# Patient Record
Sex: FEMALE | Race: WHITE | NOT HISPANIC OR LATINO | Employment: FULL TIME | URBAN - METROPOLITAN AREA
[De-identification: names, ages, dates, MRNs, and addresses within clinical notes are randomized per-mention and may not be internally consistent; named-entity substitution may affect disease eponyms.]

---
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
## 2021-05-06 ENCOUNTER — PATIENT MESSAGE (OUTPATIENT)
Dept: RESEARCH | Facility: HOSPITAL | Age: 21
End: 2021-05-06

## 2023-04-12 ENCOUNTER — TELEPHONE (OUTPATIENT)
Dept: URGENT CARE | Facility: CLINIC | Age: 23
End: 2023-04-12

## 2023-04-12 ENCOUNTER — OFFICE VISIT (OUTPATIENT)
Dept: URGENT CARE | Facility: CLINIC | Age: 23
End: 2023-04-12
Payer: MEDICAID

## 2023-04-12 VITALS
HEIGHT: 64 IN | HEART RATE: 73 BPM | SYSTOLIC BLOOD PRESSURE: 111 MMHG | BODY MASS INDEX: 44.39 KG/M2 | OXYGEN SATURATION: 97 % | RESPIRATION RATE: 20 BRPM | WEIGHT: 260 LBS | DIASTOLIC BLOOD PRESSURE: 88 MMHG | TEMPERATURE: 98 F

## 2023-04-12 DIAGNOSIS — M79.674 GREAT TOE PAIN, RIGHT: Primary | ICD-10-CM

## 2023-04-12 DIAGNOSIS — S99.921A TOE INJURY, RIGHT, INITIAL ENCOUNTER: ICD-10-CM

## 2023-04-12 PROCEDURE — 99214 PR OFFICE/OUTPT VISIT, EST, LEVL IV, 30-39 MIN: ICD-10-PCS | Mod: S$GLB,,, | Performed by: PHYSICIAN ASSISTANT

## 2023-04-12 PROCEDURE — 99214 OFFICE O/P EST MOD 30 MIN: CPT | Mod: S$GLB,,, | Performed by: PHYSICIAN ASSISTANT

## 2023-04-12 RX ORDER — ELAGOLIX 150 MG/1
1 TABLET, FILM COATED ORAL
COMMUNITY
Start: 2023-03-24

## 2023-04-12 NOTE — TELEPHONE ENCOUNTER
I called the patient to inform her that the provider states that her xray results came back negative, no signs of fractures or breaks. I confirmed with her that wrapping it may help with the swelling. Patient had no further questions or concerns.

## 2023-04-12 NOTE — PROGRESS NOTES
"Subjective:      Patient ID: Titi Huffman is a 22 y.o. female.    Vitals:  height is 5' 4" (1.626 m) and weight is 117.9 kg (260 lb). Her oral temperature is 98 °F (36.7 °C). Her blood pressure is 111/88 and her pulse is 73. Her respiration is 20 and oxygen saturation is 97%.     Chief Complaint: Foot Swelling    Patient states on Monday she had stubbed her right big toe on the cement parking block. Then next day while she was at work her whole foot was swollen.     Foot Injury   The incident occurred 3 to 5 days ago. The incident occurred in the street. The injury mechanism was a direct blow. The pain is present in the right foot. The quality of the pain is described as aching and burning. The pain is at a severity of 8/10. The pain is moderate. The pain has been Constant since onset. Associated symptoms include an inability to bear weight. She reports no foreign bodies present. The symptoms are aggravated by movement and weight bearing. She has tried acetaminophen and elevation for the symptoms. The treatment provided no relief.     Musculoskeletal:  Positive for pain, trauma, joint pain and joint swelling.    Objective:     Physical Exam   Constitutional: She is oriented to person, place, and time. She appears well-developed. She is cooperative.  Non-toxic appearance. She does not appear ill. No distress.   HENT:   Head: Normocephalic and atraumatic.   Ears:   Right Ear: Hearing normal.   Left Ear: Hearing normal.   Eyes: Conjunctivae and lids are normal. No scleral icterus.   Neck: Phonation normal. Neck supple.   Cardiovascular: Normal rate and normal pulses.   Pulmonary/Chest: Effort normal. No respiratory distress.   Abdominal: Normal appearance.   Musculoskeletal:        Feet:    Neurological: She is alert and oriented to person, place, and time. Coordination normal.   Skin: Skin is intact, not diaphoretic and not pale.   Psychiatric: Her speech is normal and behavior is normal. Judgment and thought " content normal.   Nursing note and vitals reviewed.    Assessment:     1. Great toe pain, right    2. Toe injury, right, initial encounter        Plan:       Great toe pain, right  -     XR FOOT COMPLETE 3 VIEW RIGHT; Future; Expected date: 04/12/2023    Toe injury, right, initial encounter  -     XR FOOT COMPLETE 3 VIEW RIGHT; Future; Expected date: 04/12/2023      Patient aware that we do not have imaging available today in clinic. Order placed and will obtain at [facility]. Will call with results and further recommendations. Recommend rest, ice, elevation and NSAIDs as directed on packaging. Return PRN.    XR FOOT COMPLETE 3 VIEW RIGHT    Result Date: 4/12/2023  EXAMINATION: XR FOOT COMPLETE 3 VIEW RIGHT CLINICAL HISTORY: Pain in right toe(s) FINDINGS: No fracture or dislocation.   Unremarkable soft tissues.     No acute abnormality. Electronically signed by: Dangelo Akhtar MD Date:    04/12/2023 Time:    13:16       Medical Decision Making:   Independently Interpreted Test(s):   I have ordered and independently interpreted X-rays - see summary below.       <> Summary of X-Ray Reading(s): Right foot- no acute fracture or dislocation  Clinical Tests:   Radiological Study: Ordered and Reviewed

## 2023-04-12 NOTE — LETTER
April 12, 2023      [place] [facility]  [address], [address]  [place] [address]  Phone: [phone]  Fax: [phone]       Patient: Titi Huffman   YOB: 2000  Date of Visit: 04/12/2023    To Whom It May Concern:    Gail Huffman  was at [facility] on 04/12/2023. The patient may return to work/school on 04/13/2023 with no restrictions (standing as tolerated). If you have any questions or concerns, or if I can be of further assistance, please do not hesitate to contact me.    Sincerely,    Tiff Cox PA-C

## 2023-05-01 ENCOUNTER — OFFICE VISIT (OUTPATIENT)
Dept: URGENT CARE | Facility: CLINIC | Age: 23
End: 2023-05-01
Payer: MEDICAID

## 2023-05-01 VITALS
BODY MASS INDEX: 42 KG/M2 | DIASTOLIC BLOOD PRESSURE: 88 MMHG | OXYGEN SATURATION: 98 % | RESPIRATION RATE: 20 BRPM | HEART RATE: 81 BPM | TEMPERATURE: 98 F | SYSTOLIC BLOOD PRESSURE: 128 MMHG | WEIGHT: 246 LBS | HEIGHT: 64 IN

## 2023-05-01 DIAGNOSIS — J02.9 SORE THROAT: ICD-10-CM

## 2023-05-01 DIAGNOSIS — J06.9 VIRAL UPPER RESPIRATORY ILLNESS: Primary | ICD-10-CM

## 2023-05-01 LAB
CTP QC/QA: YES
MOLECULAR STREP A: NEGATIVE

## 2023-05-01 PROCEDURE — 99213 OFFICE O/P EST LOW 20 MIN: CPT | Mod: S$GLB,,,

## 2023-05-01 PROCEDURE — 87651 POCT STREP A MOLECULAR: ICD-10-PCS | Mod: QW,S$GLB,,

## 2023-05-01 PROCEDURE — 87651 STREP A DNA AMP PROBE: CPT | Mod: QW,S$GLB,,

## 2023-05-01 PROCEDURE — 99213 PR OFFICE/OUTPT VISIT, EST, LEVL III, 20-29 MIN: ICD-10-PCS | Mod: S$GLB,,,

## 2023-05-01 RX ORDER — GUAIFENESIN 600 MG/1
1200 TABLET, EXTENDED RELEASE ORAL 2 TIMES DAILY
Qty: 40 TABLET | Refills: 0 | Status: SHIPPED | OUTPATIENT
Start: 2023-05-01 | End: 2023-05-11

## 2023-05-01 RX ORDER — CETIRIZINE HYDROCHLORIDE 10 MG/1
10 TABLET ORAL DAILY
Qty: 30 TABLET | Refills: 0 | Status: SHIPPED | OUTPATIENT
Start: 2023-05-01 | End: 2023-07-17

## 2023-05-01 RX ORDER — FLUTICASONE PROPIONATE 50 MCG
1 SPRAY, SUSPENSION (ML) NASAL DAILY
Qty: 9.9 ML | Refills: 0 | Status: SHIPPED | OUTPATIENT
Start: 2023-05-01 | End: 2023-05-08

## 2023-05-01 NOTE — PROGRESS NOTES
"Subjective:      Patient ID: Titi Huffman is a 22 y.o. female.    Vitals:  height is 5' 4" (1.626 m) and weight is 111.6 kg (246 lb). Her tympanic temperature is 98.1 °F (36.7 °C). Her blood pressure is 128/88 and her pulse is 81. Her respiration is 20 and oxygen saturation is 98%.     Chief Complaint: URI    Pt requesting strep test, patient stated she was recently exposed at work.    URI   This is a new problem. The current episode started yesterday. The problem has been gradually worsening. There has been no fever. Associated symptoms include congestion, coughing, ear pain (stabbing pain), headaches, rhinorrhea, sneezing, a sore throat and wheezing. Pertinent negatives include no sinus pain. Treatments tried: tylenol sinus severe. The treatment provided no relief.     Constitution: Negative for fever.   HENT:  Positive for ear pain (stabbing pain), congestion, postnasal drip and sore throat. Negative for sinus pain.    Respiratory:  Positive for cough and wheezing.    Allergic/Immunologic: Positive for sneezing.   Neurological:  Positive for headaches.    Objective:     Physical Exam   Constitutional: She is oriented to person, place, and time. She appears well-developed. She is cooperative.  Non-toxic appearance. She does not appear ill. No distress.   HENT:   Head: Normocephalic and atraumatic.   Ears:   Right Ear: Hearing, external ear and ear canal normal.   Left Ear: Hearing, external ear and ear canal normal.      Comments: Cloudy TM bilaterally.  Nose: Congestion present. No mucosal edema, rhinorrhea or nasal deformity. No epistaxis. Right sinus exhibits no maxillary sinus tenderness and no frontal sinus tenderness. Left sinus exhibits no maxillary sinus tenderness and no frontal sinus tenderness.   Mouth/Throat: Uvula is midline and mucous membranes are normal. Mucous membranes are moist. No trismus in the jaw. Normal dentition. No uvula swelling. Posterior oropharyngeal erythema present. No " oropharyngeal exudate or posterior oropharyngeal edema.   Eyes: Conjunctivae and lids are normal. No scleral icterus.   Neck: Trachea normal and phonation normal. Neck supple. No edema present. No erythema present. No neck rigidity present.   Cardiovascular: Normal rate, regular rhythm, normal heart sounds and normal pulses.   Pulmonary/Chest: Effort normal. No respiratory distress. She has no decreased breath sounds. She has no wheezes.         Comments: Patient is able to talk in complete sentences. No signs of increase work of breathing. No wheezing noted on auscultation.    Abdominal: Normal appearance.   Musculoskeletal: Normal range of motion.         General: No deformity. Normal range of motion.   Neurological: She is alert and oriented to person, place, and time. She exhibits normal muscle tone. Coordination normal.   Skin: Skin is warm, dry, intact, not diaphoretic and not pale.   Psychiatric: Her speech is normal and behavior is normal. Judgment and thought content normal.   Nursing note and vitals reviewed.  Results for orders placed or performed in visit on 05/01/23   POCT Strep A, Molecular   Result Value Ref Range    Molecular Strep A, POC Negative Negative     Acceptable Yes        Assessment:     1. Viral upper respiratory illness    2. Sore throat        Plan:       Viral upper respiratory illness  -     fluticasone propionate (FLONASE) 50 mcg/actuation nasal spray; 1 spray (50 mcg total) by Each Nostril route once daily. for 7 days  Dispense: 9.9 mL; Refill: 0  -     guaiFENesin (MUCINEX) 600 mg 12 hr tablet; Take 2 tablets (1,200 mg total) by mouth 2 (two) times daily. for 10 days  Dispense: 40 tablet; Refill: 0  -     cetirizine (ZYRTEC) 10 MG tablet; Take 1 tablet (10 mg total) by mouth once daily.  Dispense: 30 tablet; Refill: 0    Sore throat  -     POCT Strep A, Molecular      VS stable.    Patient declined Covid swab. Strep negative. Gargle with warm salt water.   Please  drink plenty of fluids. Please get plenty of rest.  Please return here or go to the Emergency Department for any concerns or worsening of condition.  If you do not have Hypertension or any history of palpitations, it is ok to take over the counter Sudafed or Mucinex D or Allegra-D or Claritin-D or Zyrtec-D.  If you do take one of the above, it is ok to combine that with plain over the counter Mucinex or Allegra or Claritin or Zyrtec.  If for example you are taking Zyrtec -D, you can combine that with Mucinex, but not Mucinex-D.  If you are taking Mucinex-D, you can combine that with plain Allegra or Claritin or Zyrtec.   If you do have Hypertension or palpitations, it is safe to take Coricidin HBP for relief of sinus symptoms.  If not allergic, please take over the counter Tylenol (Acetaminophen) and/or Motrin (Ibuprofen) as directed for control of pain and/or fever.  Please follow up with your primary care doctor or specialist as needed.    If you  smoke, please stop smoking.     Discussed with patient the importance of f/u with their primary care provider. Urged to go to the ER for any worsening signs or symptoms.

## 2023-05-01 NOTE — LETTER
May 1, 2023      [place] [facility]  [address], [address]  [place] [address]  Phone: [phone]  Fax: [phone]       Patient: Titi Huffman   YOB: 2000  Date of Visit: 05/01/2023    To Whom It May Concern:    Gail Huffman  was at [facility] on 05/01/2023. The patient may return to work/school on 5/2/23 with no restrictions. If you have any questions or concerns, or if I can be of further assistance, please do not hesitate to contact me.    Sincerely,    Mandy Corea PA-C

## 2023-07-17 ENCOUNTER — OFFICE VISIT (OUTPATIENT)
Dept: URGENT CARE | Facility: CLINIC | Age: 23
End: 2023-07-17
Payer: MEDICAID

## 2023-07-17 VITALS
WEIGHT: 253 LBS | BODY MASS INDEX: 42.15 KG/M2 | DIASTOLIC BLOOD PRESSURE: 82 MMHG | TEMPERATURE: 99 F | HEART RATE: 73 BPM | HEIGHT: 65 IN | SYSTOLIC BLOOD PRESSURE: 114 MMHG | RESPIRATION RATE: 18 BRPM | OXYGEN SATURATION: 96 %

## 2023-07-17 DIAGNOSIS — L50.9 URTICARIAL RASH: Primary | ICD-10-CM

## 2023-07-17 PROCEDURE — 99213 OFFICE O/P EST LOW 20 MIN: CPT | Mod: S$GLB,,, | Performed by: PHYSICIAN ASSISTANT

## 2023-07-17 PROCEDURE — 99213 PR OFFICE/OUTPT VISIT, EST, LEVL III, 20-29 MIN: ICD-10-PCS | Mod: S$GLB,,, | Performed by: PHYSICIAN ASSISTANT

## 2023-07-17 RX ORDER — CETIRIZINE HYDROCHLORIDE 10 MG/1
10 TABLET ORAL DAILY
Qty: 30 TABLET | Refills: 0 | Status: SHIPPED | OUTPATIENT
Start: 2023-07-17 | End: 2024-07-16

## 2023-07-17 RX ORDER — TRIAMCINOLONE ACETONIDE 1 MG/G
CREAM TOPICAL 2 TIMES DAILY
Qty: 30 G | Refills: 0 | Status: SHIPPED | OUTPATIENT
Start: 2023-07-17

## 2023-07-17 RX ORDER — PREDNISONE 20 MG/1
20 TABLET ORAL DAILY
Qty: 5 TABLET | Refills: 0 | Status: SHIPPED | OUTPATIENT
Start: 2023-07-17 | End: 2023-07-22

## 2023-07-17 NOTE — PROGRESS NOTES
"Subjective:      Patient ID: Titi Huffman is a 23 y.o. female.    Vitals:  height is 5' 5" (1.651 m) and weight is 114.8 kg (253 lb). Her oral temperature is 98.5 °F (36.9 °C). Her blood pressure is 114/82 and her pulse is 73. Her respiration is 18 and oxygen saturation is 96%.     Chief Complaint: Rash    Patient c/o rash that she noticed around 7 am. States the rash is everywhere but worse on the arms. She has not used any otc treatments. Patient states she was at her uncles house who has spots of poison ivy but she does not recall contact with the plant.    Denies any changes in soaps, detergents or lotions. No knew medications. Denies SOB or trouble swallowing    Rash  This is a new problem. The current episode started today. The problem is unchanged. The affected locations include the head, left arm, left hand, left wrist, left foot, left lower leg, right arm, right hand, right wrist, right lower leg and right foot. The rash is characterized by dryness, itchiness, redness and swelling. It is unknown if there was an exposure to a precipitant. Past treatments include nothing. The treatment provided no relief.     Skin:  Positive for rash.    Objective:     Physical Exam   Constitutional: She is oriented to person, place, and time. She appears well-developed. She is cooperative.  Non-toxic appearance. She does not appear ill. No distress.   HENT:   Head: Normocephalic and atraumatic.   Ears:   Right Ear: Hearing normal.   Left Ear: Hearing normal.   Mouth/Throat: Mucous membranes are moist. No oropharyngeal exudate or posterior oropharyngeal erythema. Oropharynx is clear.   Eyes: Conjunctivae and lids are normal. No scleral icterus.   Neck: Phonation normal. Neck supple.   Cardiovascular: Normal rate.   Pulmonary/Chest: Effort normal. No respiratory distress.   Abdominal: Normal appearance.   Neurological: She is alert and oriented to person, place, and time. Coordination normal.   Skin: Skin is intact, " not diaphoretic, not pale and rash.         Comments: Urticarial rash to left forearm, abdomen and right hand   Psychiatric: Her speech is normal and behavior is normal. Judgment and thought content normal.   Nursing note and vitals reviewed.    Assessment:     1. Urticarial rash        Plan:       Urticarial rash  -     cetirizine (ZYRTEC) 10 MG tablet; Take 1 tablet (10 mg total) by mouth once daily.  Dispense: 30 tablet; Refill: 0  -     triamcinolone acetonide 0.1% (KENALOG) 0.1 % cream; Apply topically 2 (two) times daily. Do not use on face or in groin  Dispense: 30 g; Refill: 0  -     predniSONE (DELTASONE) 20 MG tablet; Take 1 tablet (20 mg total) by mouth once daily. for 5 days  Dispense: 5 tablet; Refill: 0      Discussed with patient the importance of f/u with their primary care provider. Urged to go to the ER for any worsening signs or symptoms.

## 2023-07-17 NOTE — LETTER
July 17, 2023      [place] [facility]  [address], [address]  [place] [address]  Phone: [phone]  Fax: [phone]       Patient: Titi Huffman   YOB: 2000  Date of Visit: 07/17/2023    To Whom It May Concern:    Gail Huffman  was at [facility] on 07/17/2023. The patient may return to work/school on 07/18/2023 with no restrictions. If you have any questions or concerns, or if I can be of further assistance, please do not hesitate to contact me.    Sincerely,    Tiff Cox PA-C

## 2023-10-08 ENCOUNTER — OFFICE VISIT (OUTPATIENT)
Dept: URGENT CARE | Facility: CLINIC | Age: 23
End: 2023-10-08
Payer: MEDICAID

## 2023-10-08 VITALS
OXYGEN SATURATION: 96 % | DIASTOLIC BLOOD PRESSURE: 95 MMHG | HEIGHT: 65 IN | BODY MASS INDEX: 42.82 KG/M2 | SYSTOLIC BLOOD PRESSURE: 130 MMHG | TEMPERATURE: 100 F | WEIGHT: 257 LBS | RESPIRATION RATE: 21 BRPM | HEART RATE: 101 BPM

## 2023-10-08 DIAGNOSIS — K04.7 DENTAL ABSCESS: Primary | ICD-10-CM

## 2023-10-08 DIAGNOSIS — K02.9 DENTAL DECAY: ICD-10-CM

## 2023-10-08 DIAGNOSIS — K03.81 CRACKED TOOTH: ICD-10-CM

## 2023-10-08 PROCEDURE — 99214 OFFICE O/P EST MOD 30 MIN: CPT | Mod: S$GLB,,, | Performed by: NURSE PRACTITIONER

## 2023-10-08 PROCEDURE — 99214 PR OFFICE/OUTPT VISIT, EST, LEVL IV, 30-39 MIN: ICD-10-PCS | Mod: S$GLB,,, | Performed by: NURSE PRACTITIONER

## 2023-10-08 RX ORDER — NAPROXEN 500 MG/1
500 TABLET ORAL 2 TIMES DAILY WITH MEALS
Qty: 14 TABLET | Refills: 0 | Status: SHIPPED | OUTPATIENT
Start: 2023-10-08 | End: 2023-10-15

## 2023-10-08 RX ORDER — TRAZODONE HYDROCHLORIDE 100 MG/1
100 TABLET ORAL NIGHTLY
COMMUNITY
Start: 2023-09-26

## 2023-10-08 RX ORDER — CLINDAMYCIN HYDROCHLORIDE 150 MG/1
450 CAPSULE ORAL EVERY 8 HOURS
Qty: 63 CAPSULE | Refills: 0 | Status: SHIPPED | OUTPATIENT
Start: 2023-10-08 | End: 2023-10-15

## 2023-10-08 RX ORDER — LURASIDONE HYDROCHLORIDE 20 MG/1
20 TABLET, FILM COATED ORAL
COMMUNITY
Start: 2023-09-26

## 2023-10-08 NOTE — PATIENT INSTRUCTIONS
1. Your dental pain is caused by dental decay (cavities) and/or dental infection (abscess).  Antibiotics will help to treat the infection (abscess) if you have one but will not necessarily get rid of the pain.  You will be prescribed NSAIDs to help with your pain, but the only thing that will truly get rid of your pain is to see a Dentist and have your tooth/teeth fixed.  You should schedule a follow-up appointment with a Dentist ASAP (within 1-5 days).   2. -[facility] on [address] [place] [address], [place], [place], [phone], accepts Adult Medicaid insurance.   -You can also call [facility] at [phone] or [phone]. Their website ([email or web address]) has a lot of information about what to expect and payment options.  They are currently accepting new patients. [facility]'s prices are typically 1/3 to 1/4 of private practice prices.  3.  Take all medications as directed. If you have been prescribed antibiotics, make sure to complete them.   4.  Rest and keep yourself/patient well hydrated. For adults, it is recommended to drink at least 8-10 glasses of water daily.   5.  For patients above 6 months of age who are not allergic to and are not on anticoagulants, you can alternate Tylenol and Motrin every 4-6 hours for fever above 100.4F and/or pain.  For patients less than 6 months of age, allergic to or intolerant to NSAIDS, have gastritis, gastric ulcers, or history of GI bleeds, are pregnant, or are on anticoagulant therapy, you can take Tylenol every 4 hours as needed for fever above 100.4F and/or pain.   6.  If your condition fails to improve in a timely manner, you should receive another evaluation by your Primary Care Provider/Pediatrician to discuss your concerns or return to urgent care for a recheck.  If your condition worsens at any time, you should report immediately to your nearest Emergency Department for further evaluation. **You must  "understand that you have received Urgent Care treatment only and that you may be released before all of your medical problems are known or treated. You, the patient, are responsible to arrange for follow-up care as instructed.     HOME CARE:  1. Avoid extremely hot and cold foods and liquids since your tooth may be sensitive to temperature changes.  2. If your tooth is chipped or cracked, or if there is a large open cavity, apply OIL OF CLOVES (available over-the-counter in drug stores) directly to the tooth to reduce pain. Some pharmacies carry an over-the-counter "toothache kit." This contains a paste, which can be applied over the exposed tooth to decrease sensitivity.  3. A cold pack on your jaw over the sore area may help reduce pain and swelling.      TOOTHACHE IS A SIGN OF DISEASE IN YOUR TOOTH AND SHOULD BE EXAMINED AND TREATED BY A DENTIST ASAP.    GET PROMPT MEDICAL ATTENTION if any of the following occur:  Your face becomes swollen or red  Pain worsens or spreads to the neck  Fever over 100.4º F (38.0º C)  Unusual drowsiness; headache or stiff neck; weakness or fainting  Pus drains from the tooth  Difficulty swallowing or breathing   "

## 2023-10-08 NOTE — PROGRESS NOTES
"Subjective:      Patient ID: Titi Huffman is a 23 y.o. female.    Vitals:  height is 5' 5" (1.651 m) and weight is 116.6 kg (257 lb). Her tympanic temperature is 99.5 °F (37.5 °C). Her blood pressure is 130/95 (abnormal) and her pulse is 101. Her respiration is 21 (abnormal) and oxygen saturation is 96%.     Chief Complaint: Dental Pain    Dental Pain   This is a new problem. The current episode started yesterday. The problem occurs constantly. The problem has been gradually worsening. The pain is at a severity of 8/10. The pain is moderate. Associated symptoms include difficulty swallowing, facial pain and sinus pressure. Pertinent negatives include no fever, oral bleeding or thermal sensitivity. She has tried ice and NSAIDs (cold compress and tylenol) for the symptoms. The treatment provided no relief.       Constitution: Negative for fever.   HENT:  Positive for sinus pressure.       Objective:     Physical Exam   Constitutional: She is oriented to person, place, and time. She appears well-developed. She is cooperative.  Non-toxic appearance. No distress.   HENT:   Head: Normocephalic and atraumatic.       Ears:   Right Ear: Tympanic membrane, external ear and ear canal normal.   Left Ear: Tympanic membrane, external ear and ear canal normal.   Nose: Mucosal edema present. No rhinorrhea or nasal deformity. No epistaxis. Right sinus exhibits no maxillary sinus tenderness and no frontal sinus tenderness. Left sinus exhibits no maxillary sinus tenderness and no frontal sinus tenderness.   Mouth/Throat: Uvula is midline, oropharynx is clear and moist and mucous membranes are normal. No trismus in the jaw. Abnormal dentition. Dental abscesses and dental caries present. No uvula swelling. No oropharyngeal exudate, posterior oropharyngeal edema or posterior oropharyngeal erythema.       Eyes: Conjunctivae and lids are normal. No scleral icterus.   Neck: Trachea normal and phonation normal. Neck supple. No edema " present. No erythema present. No neck rigidity present.   Cardiovascular: Normal rate, regular rhythm, normal heart sounds and normal pulses.   Pulmonary/Chest: Effort normal and breath sounds normal. No respiratory distress. She has no decreased breath sounds. She has no rhonchi.   Abdominal: Normal appearance.   Musculoskeletal: Normal range of motion.         General: No deformity. Normal range of motion.   Neurological: She is alert and oriented to person, place, and time. She exhibits normal muscle tone. Coordination normal.   Skin: Skin is warm, dry, intact, not diaphoretic and not pale.   Psychiatric: Her speech is normal and behavior is normal. Judgment and thought content normal.   Nursing note and vitals reviewed.      Assessment:     1. Dental abscess    2. Cracked tooth    3. Dental decay        Plan:       Dental abscess  -     clindamycin (CLEOCIN) 150 MG capsule; Take 3 capsules (450 mg total) by mouth every 8 (eight) hours. for 7 days  Dispense: 63 capsule; Refill: 0  -     naproxen (NAPROSYN) 500 MG tablet; Take 1 tablet (500 mg total) by mouth 2 (two) times daily with meals. for 7 days  Dispense: 14 tablet; Refill: 0  -     Ambulatory referral/consult to Dentistry    Cracked tooth  -     Ambulatory referral/consult to Dentistry    Dental decay  -     Ambulatory referral/consult to Dentistry

## 2023-10-08 NOTE — LETTER
October 8, 2023      [place]  [address], [address]  [place] [address]  Phone: [phone]  Fax: [phone]       Patient: Titi Huffman   YOB: 2000  Date of Visit: 10/08/2023    To Whom It May Concern:    Gail Huffman  was at [facility] on 10/08/2023. The patient may return to work/school on 10/10/2023 with no restrictions. If you have any questions or concerns, or if I can be of further assistance, please do not hesitate to contact me.    Sincerely,    Shy Hernandez MA

## 2024-08-10 ENCOUNTER — OFFICE VISIT (OUTPATIENT)
Dept: URGENT CARE | Facility: CLINIC | Age: 24
End: 2024-08-10

## 2024-08-10 VITALS
HEART RATE: 98 BPM | RESPIRATION RATE: 17 BRPM | OXYGEN SATURATION: 99 % | BODY MASS INDEX: 41.15 KG/M2 | WEIGHT: 247 LBS | DIASTOLIC BLOOD PRESSURE: 84 MMHG | SYSTOLIC BLOOD PRESSURE: 122 MMHG | TEMPERATURE: 99 F | HEIGHT: 65 IN

## 2024-08-10 DIAGNOSIS — M67.432 GANGLION OF LEFT WRIST: Primary | ICD-10-CM

## 2024-08-10 PROCEDURE — 99214 OFFICE O/P EST MOD 30 MIN: CPT | Mod: TIER,S$GLB,,

## 2024-08-10 NOTE — PROGRESS NOTES
"Subjective:      Patient ID: Titi Huffman is a 24 y.o. female.    Vitals:  height is 5' 5" (1.651 m) and weight is 112 kg (247 lb). Her oral temperature is 98.9 °F (37.2 °C). Her blood pressure is 122/84 and her pulse is 98. Her respiration is 17 and oxygen saturation is 99%.     Chief Complaint: Hand Pain    Pt is coming in for a bump on her right wrist that she noticed last night. Pt has also had numbness in the top of her hand on and off for a year and before going numb, feels like her hand is on fire.    Hand Pain   Her dominant hand is their left hand. The incident occurred 12 to 24 hours ago. The incident occurred at home. There was no injury mechanism. The pain is present in the left wrist and left hand. Quality: numbing. The pain does not radiate. The pain is at a severity of 5/10. The pain is mild. The pain has been Fluctuating since the incident. Nothing aggravates the symptoms. Treatments tried: excedrine. The treatment provided no relief.       Constitution: Negative for fever.   Skin:  Negative for erythema.        Lump on wrist      Objective:     Physical Exam   Constitutional: She is oriented to person, place, and time. She appears well-developed.  Non-toxic appearance. She does not appear ill. No distress.   HENT:   Head: Normocephalic and atraumatic. Head is without abrasion, without contusion and without laceration.   Ears:   Right Ear: External ear normal.   Left Ear: External ear normal.   Nose: Nose normal.   Mouth/Throat: Oropharynx is clear and moist and mucous membranes are normal.   Eyes: Conjunctivae, EOM and lids are normal.   Neck: Trachea normal and phonation normal. Neck supple.   Cardiovascular: Normal rate.   Pulmonary/Chest: Effort normal. No stridor. No respiratory distress.   Musculoskeletal: Normal range of motion.         General: Normal range of motion.      Comments: Cyst to L posterior wrist   Neurological: She is alert and oriented to person, place, and time.   Skin: " Skin is warm, dry, intact, not diaphoretic and no rash. Capillary refill takes less than 2 seconds. No abrasion, No burn, No bruising, No erythema and No ecchymosis   Psychiatric: Her speech is normal and behavior is normal. Judgment and thought content normal.   Nursing note and vitals reviewed.      Assessment:     1. Ganglion of left wrist        Plan:       Ganglion of left wrist  -     Ambulatory referral/consult to Orthopedics      Patient Instructions   1.  A referral has been placed for you to see an orthopedic surgeon. See attached handout for more information and instructions regarding your condition and how to manage it.   2.  Rest and keep yourself/patient well hydrated.  3.  You can alternate Tylenol and Motrin every 4-6 hours for fever above 100.4F and/or pain.   4. You should schedule a follow-up appointment with your Primary Care Provider for recheck in 2-3 days or as directed at this visit.   5.  If your condition fails to improve in a timely manner, you should receive another evaluation by your Primary Care Provider to discuss your concerns or return to urgent care for a recheck.  If your condition worsens at any time, you should report immediately to your nearest Emergency Department for further evaluation. **You must understand that you have received Urgent Care treatment only and that you may be released before all of your medical problems are known or treated. You, the patient, are responsible to arrange for follow-up care as instructed.

## 2024-08-10 NOTE — PATIENT INSTRUCTIONS
1.  A referral has been placed for you to see an orthopedic surgeon. See attached handout for more information and instructions regarding your condition and how to manage it.   2.  Rest and keep yourself/patient well hydrated.  3.  You can alternate Tylenol and Motrin every 4-6 hours for fever above 100.4F and/or pain.   4. You should schedule a follow-up appointment with your Primary Care Provider for recheck in 2-3 days or as directed at this visit.   5.  If your condition fails to improve in a timely manner, you should receive another evaluation by your Primary Care Provider to discuss your concerns or return to urgent care for a recheck.  If your condition worsens at any time, you should report immediately to your nearest Emergency Department for further evaluation. **You must understand that you have received Urgent Care treatment only and that you may be released before all of your medical problems are known or treated. You, the patient, are responsible to arrange for follow-up care as instructed.